# Patient Record
Sex: FEMALE | Race: WHITE | NOT HISPANIC OR LATINO | Employment: PART TIME | URBAN - METROPOLITAN AREA
[De-identification: names, ages, dates, MRNs, and addresses within clinical notes are randomized per-mention and may not be internally consistent; named-entity substitution may affect disease eponyms.]

---

## 2017-01-11 RX ORDER — ATORVASTATIN CALCIUM 40 MG/1
TABLET, FILM COATED ORAL
Qty: 90 TABLET | Refills: 1 | Status: SHIPPED | OUTPATIENT
Start: 2017-01-11 | End: 2017-03-18 | Stop reason: HOSPADM

## 2017-02-13 RX ORDER — DILTIAZEM HYDROCHLORIDE 60 MG/1
TABLET, FILM COATED ORAL
Qty: 30.6 INHALER | Refills: 1 | Status: SHIPPED | OUTPATIENT
Start: 2017-02-13

## 2017-03-16 ENCOUNTER — APPOINTMENT (OUTPATIENT)
Dept: GENERAL RADIOLOGY | Facility: HOSPITAL | Age: 72
End: 2017-03-16

## 2017-03-16 ENCOUNTER — APPOINTMENT (OUTPATIENT)
Dept: GENERAL RADIOLOGY | Facility: HOSPITAL | Age: 72
End: 2017-03-16
Attending: HOSPITALIST

## 2017-03-16 ENCOUNTER — HOSPITAL ENCOUNTER (OUTPATIENT)
Facility: HOSPITAL | Age: 72
Setting detail: OBSERVATION
Discharge: HOME | End: 2017-03-18
Attending: EMERGENCY MEDICINE | Admitting: HOSPITALIST

## 2017-03-16 ENCOUNTER — APPOINTMENT (OUTPATIENT)
Dept: CT IMAGING | Facility: HOSPITAL | Age: 72
End: 2017-03-16

## 2017-03-16 DIAGNOSIS — R55 NEAR SYNCOPE: ICD-10-CM

## 2017-03-16 DIAGNOSIS — K92.2 GASTROINTESTINAL HEMORRHAGE, UNSPECIFIED GASTROINTESTINAL HEMORRHAGE TYPE: ICD-10-CM

## 2017-03-16 DIAGNOSIS — K52.9 COLITIS: Primary | ICD-10-CM

## 2017-03-16 PROBLEM — I95.1 ORTHOSTATIC HYPOTENSION: Status: ACTIVE | Noted: 2017-03-16

## 2017-03-16 PROBLEM — D62 ANEMIA DUE TO ACUTE BLOOD LOSS: Status: ACTIVE | Noted: 2017-03-16

## 2017-03-16 PROBLEM — K55.9 ISCHEMIC COLITIS (HCC): Status: ACTIVE | Noted: 2017-03-16

## 2017-03-16 LAB
ABO GROUP BLD: NORMAL
ALBUMIN SERPL-MCNC: 4.2 G/DL (ref 3.5–5.2)
ALBUMIN/GLOB SERPL: 1.5 G/DL
ALP SERPL-CCNC: 76 U/L (ref 39–117)
ALT SERPL W P-5'-P-CCNC: 19 U/L (ref 1–33)
ANION GAP SERPL CALCULATED.3IONS-SCNC: 12.9 MMOL/L
AST SERPL-CCNC: 20 U/L (ref 1–32)
BACTERIA UR QL AUTO: NORMAL /HPF
BASOPHILS # BLD AUTO: 0.02 10*3/MM3 (ref 0–0.2)
BASOPHILS NFR BLD AUTO: 0.3 % (ref 0–1.5)
BILIRUB SERPL-MCNC: 0.5 MG/DL (ref 0.1–1.2)
BILIRUB UR QL STRIP: NEGATIVE
BLD GP AB SCN SERPL QL: NEGATIVE
BUN BLD-MCNC: 24 MG/DL (ref 8–23)
BUN/CREAT SERPL: 24.5 (ref 7–25)
CALCIUM SPEC-SCNC: 9.3 MG/DL (ref 8.6–10.5)
CHLORIDE SERPL-SCNC: 102 MMOL/L (ref 98–107)
CLARITY UR: CLEAR
CO2 SERPL-SCNC: 26.1 MMOL/L (ref 22–29)
COLOR UR: YELLOW
CREAT BLD-MCNC: 0.98 MG/DL (ref 0.57–1)
DEPRECATED RDW RBC AUTO: 43.2 FL (ref 37–54)
EOSINOPHIL # BLD AUTO: 0.03 10*3/MM3 (ref 0–0.7)
EOSINOPHIL NFR BLD AUTO: 0.5 % (ref 0.3–6.2)
ERYTHROCYTE [DISTWIDTH] IN BLOOD BY AUTOMATED COUNT: 12.7 % (ref 11.7–13)
GFR SERPL CREATININE-BSD FRML MDRD: 56 ML/MIN/1.73
GLOBULIN UR ELPH-MCNC: 2.8 GM/DL
GLUCOSE BLD-MCNC: 118 MG/DL (ref 65–99)
GLUCOSE UR STRIP-MCNC: NEGATIVE MG/DL
HCT VFR BLD AUTO: 30.5 % (ref 35.6–45.5)
HCT VFR BLD AUTO: 32.3 % (ref 35.6–45.5)
HGB BLD-MCNC: 10.1 G/DL (ref 11.9–15.5)
HGB BLD-MCNC: 10.9 G/DL (ref 11.9–15.5)
HGB UR QL STRIP.AUTO: NEGATIVE
HYALINE CASTS UR QL AUTO: NORMAL /LPF
IMM GRANULOCYTES # BLD: 0.02 10*3/MM3 (ref 0–0.03)
IMM GRANULOCYTES NFR BLD: 0.3 % (ref 0–0.5)
KETONES UR QL STRIP: NEGATIVE
LEUKOCYTE ESTERASE UR QL STRIP.AUTO: ABNORMAL
LYMPHOCYTES # BLD AUTO: 1.37 10*3/MM3 (ref 0.9–4.8)
LYMPHOCYTES NFR BLD AUTO: 21.4 % (ref 19.6–45.3)
MCH RBC QN AUTO: 31.5 PG (ref 26.9–32)
MCHC RBC AUTO-ENTMCNC: 33.7 G/DL (ref 32.4–36.3)
MCV RBC AUTO: 93.4 FL (ref 80.5–98.2)
MONOCYTES # BLD AUTO: 0.63 10*3/MM3 (ref 0.2–1.2)
MONOCYTES NFR BLD AUTO: 9.8 % (ref 5–12)
NEUTROPHILS # BLD AUTO: 4.34 10*3/MM3 (ref 1.9–8.1)
NEUTROPHILS NFR BLD AUTO: 67.7 % (ref 42.7–76)
NITRITE UR QL STRIP: NEGATIVE
NT-PROBNP SERPL-MCNC: 176.9 PG/ML (ref 0–900)
PH UR STRIP.AUTO: 7 [PH] (ref 5–8)
PLATELET # BLD AUTO: 213 10*3/MM3 (ref 140–500)
PMV BLD AUTO: 9.8 FL (ref 6–12)
POTASSIUM BLD-SCNC: 4.1 MMOL/L (ref 3.5–5.2)
PROT SERPL-MCNC: 7 G/DL (ref 6–8.5)
PROT UR QL STRIP: NEGATIVE
RBC # BLD AUTO: 3.46 10*6/MM3 (ref 3.9–5.2)
RBC # UR: NORMAL /HPF
REF LAB TEST METHOD: NORMAL
RH BLD: POSITIVE
SODIUM BLD-SCNC: 141 MMOL/L (ref 136–145)
SP GR UR STRIP: 1.02 (ref 1–1.03)
SQUAMOUS #/AREA URNS HPF: NORMAL /HPF
UROBILINOGEN UR QL STRIP: ABNORMAL
WBC NRBC COR # BLD: 6.41 10*3/MM3 (ref 4.5–10.7)
WBC UR QL AUTO: NORMAL /HPF

## 2017-03-16 PROCEDURE — 99284 EMERGENCY DEPT VISIT MOD MDM: CPT

## 2017-03-16 PROCEDURE — 74177 CT ABD & PELVIS W/CONTRAST: CPT

## 2017-03-16 PROCEDURE — G0378 HOSPITAL OBSERVATION PER HR: HCPCS

## 2017-03-16 PROCEDURE — 86900 BLOOD TYPING SEROLOGIC ABO: CPT | Performed by: NURSE PRACTITIONER

## 2017-03-16 PROCEDURE — 0 IOPAMIDOL 61 % SOLUTION: Performed by: NURSE PRACTITIONER

## 2017-03-16 PROCEDURE — 96361 HYDRATE IV INFUSION ADD-ON: CPT

## 2017-03-16 PROCEDURE — 86901 BLOOD TYPING SEROLOGIC RH(D): CPT | Performed by: NURSE PRACTITIONER

## 2017-03-16 PROCEDURE — 81001 URINALYSIS AUTO W/SCOPE: CPT | Performed by: NURSE PRACTITIONER

## 2017-03-16 PROCEDURE — 85014 HEMATOCRIT: CPT | Performed by: HOSPITALIST

## 2017-03-16 PROCEDURE — 83880 ASSAY OF NATRIURETIC PEPTIDE: CPT | Performed by: NURSE PRACTITIONER

## 2017-03-16 PROCEDURE — 85025 COMPLETE CBC W/AUTO DIFF WBC: CPT | Performed by: NURSE PRACTITIONER

## 2017-03-16 PROCEDURE — 86850 RBC ANTIBODY SCREEN: CPT | Performed by: NURSE PRACTITIONER

## 2017-03-16 PROCEDURE — 85018 HEMOGLOBIN: CPT | Performed by: HOSPITALIST

## 2017-03-16 PROCEDURE — 71101 X-RAY EXAM UNILAT RIBS/CHEST: CPT

## 2017-03-16 PROCEDURE — 71020 HC CHEST PA AND LATERAL: CPT

## 2017-03-16 PROCEDURE — 80053 COMPREHEN METABOLIC PANEL: CPT | Performed by: NURSE PRACTITIONER

## 2017-03-16 PROCEDURE — 96360 HYDRATION IV INFUSION INIT: CPT

## 2017-03-16 RX ORDER — LISINOPRIL 10 MG/1
10 TABLET ORAL DAILY
Status: DISCONTINUED | OUTPATIENT
Start: 2017-03-17 | End: 2017-03-18 | Stop reason: HOSPADM

## 2017-03-16 RX ORDER — HYDROCODONE BITARTRATE AND ACETAMINOPHEN 7.5; 325 MG/1; MG/1
1 TABLET ORAL EVERY 6 HOURS PRN
Status: DISCONTINUED | OUTPATIENT
Start: 2017-03-16 | End: 2017-03-18 | Stop reason: HOSPADM

## 2017-03-16 RX ORDER — BUDESONIDE AND FORMOTEROL FUMARATE DIHYDRATE 80; 4.5 UG/1; UG/1
2 AEROSOL RESPIRATORY (INHALATION)
Status: DISCONTINUED | OUTPATIENT
Start: 2017-03-16 | End: 2017-03-17

## 2017-03-16 RX ORDER — ACETAMINOPHEN 325 MG/1
650 TABLET ORAL EVERY 4 HOURS PRN
Status: DISCONTINUED | OUTPATIENT
Start: 2017-03-16 | End: 2017-03-18 | Stop reason: HOSPADM

## 2017-03-16 RX ORDER — HYDROCODONE BITARTRATE AND ACETAMINOPHEN 7.5; 325 MG/1; MG/1
1 TABLET ORAL ONCE
Status: COMPLETED | OUTPATIENT
Start: 2017-03-16 | End: 2017-03-16

## 2017-03-16 RX ORDER — ONDANSETRON 2 MG/ML
4 INJECTION INTRAMUSCULAR; INTRAVENOUS EVERY 4 HOURS PRN
Status: DISCONTINUED | OUTPATIENT
Start: 2017-03-16 | End: 2017-03-18 | Stop reason: HOSPADM

## 2017-03-16 RX ORDER — SODIUM CHLORIDE 0.9 % (FLUSH) 0.9 %
1-10 SYRINGE (ML) INJECTION AS NEEDED
Status: DISCONTINUED | OUTPATIENT
Start: 2017-03-16 | End: 2017-03-18 | Stop reason: HOSPADM

## 2017-03-16 RX ORDER — DIPHENHYDRAMINE HCL 50 MG
50 CAPSULE ORAL NIGHTLY PRN
COMMUNITY

## 2017-03-16 RX ORDER — ATORVASTATIN CALCIUM 40 MG/1
40 TABLET, FILM COATED ORAL DAILY
Status: DISCONTINUED | OUTPATIENT
Start: 2017-03-17 | End: 2017-03-18 | Stop reason: HOSPADM

## 2017-03-16 RX ORDER — FLUOXETINE HYDROCHLORIDE 20 MG/1
40 CAPSULE ORAL DAILY
Status: DISCONTINUED | OUTPATIENT
Start: 2017-03-17 | End: 2017-03-18 | Stop reason: HOSPADM

## 2017-03-16 RX ORDER — SODIUM CHLORIDE 9 MG/ML
100 INJECTION, SOLUTION INTRAVENOUS CONTINUOUS
Status: DISCONTINUED | OUTPATIENT
Start: 2017-03-16 | End: 2017-03-17

## 2017-03-16 RX ORDER — DIPHENHYDRAMINE HCL 25 MG
50 CAPSULE ORAL NIGHTLY PRN
Status: DISCONTINUED | OUTPATIENT
Start: 2017-03-16 | End: 2017-03-18 | Stop reason: HOSPADM

## 2017-03-16 RX ORDER — ALBUTEROL SULFATE 2.5 MG/3ML
2.5 SOLUTION RESPIRATORY (INHALATION) EVERY 6 HOURS PRN
Status: DISCONTINUED | OUTPATIENT
Start: 2017-03-16 | End: 2017-03-18 | Stop reason: HOSPADM

## 2017-03-16 RX ORDER — NITROGLYCERIN 0.4 MG/1
0.4 TABLET SUBLINGUAL
Status: DISCONTINUED | OUTPATIENT
Start: 2017-03-16 | End: 2017-03-18 | Stop reason: HOSPADM

## 2017-03-16 RX ADMIN — IOPAMIDOL 85 ML: 612 INJECTION, SOLUTION INTRAVENOUS at 12:38

## 2017-03-16 RX ADMIN — SODIUM CHLORIDE 100 ML/HR: 9 INJECTION, SOLUTION INTRAVENOUS at 18:15

## 2017-03-16 RX ADMIN — HYDROCODONE BITARTRATE AND ACETAMINOPHEN 1 TABLET: 7.5; 325 TABLET ORAL at 11:44

## 2017-03-16 NOTE — ED PROVIDER NOTES
Pt presents to the ED c/o bright red blood in her stool onset yesterday. She reports having a routine colonoscopy 6 days ago and had a polyp removed. She also reports syncopal episode yesterday and sharp back pain, noting that she feel and hit her back several days ago. Upon exam, pt is alert and oriented x3. Her PERRL and she has moist mucous membranes. Her heart is regular rate and rhythm in the 90's. Pt's lungs are clear to auscultation bilaterally. Her bowel sounds are diminished and she has RLQ abd tenderness. There is point tenderness to the R subscapular area. Will admit to [facility] for further care.    I supervised care provided by the midlevel provider.    We have discussed this patient's history, physical exam, and treatment plan.   I have reviewed the note and personally saw and examined the patient and agree with the plan of care.    Documentation assistance provided by john Kumari for Dr. Mccracken.  Information recorded by the scribe was done at my direction and has been verified and validated by me.     Travis Kumari  03/16/17 5999       Khai Mccracken MD  03/16/17 2024

## 2017-03-16 NOTE — SIGNIFICANT NOTE
03/16/17 1529   Rehab Treatment   Discipline physical therapist   Rehab Evaluation   Evaluation Not Performed patient unavailable for evaluation  (Pt off floor for x-ray PT will follow up3/17)   Recommendation   PT - Next Appointment 03/17/17

## 2017-03-16 NOTE — PLAN OF CARE
Problem: Pain, Acute (Adult)  Goal: Identify Related Risk Factors and Signs and Symptoms  Outcome: Outcome(s) achieved Date Met:  03/16/17 03/16/17 3298   Pain, Acute   Related Risk Factors (Acute Pain) trauma   Signs and Symptoms (Acute Pain) verbalization of pain descriptors

## 2017-03-16 NOTE — H&P
HISTORY AND PHYSICAL   [facility]        Patient Identification:  Name: Crys Dumont  Age: 72 y.o.  Sex: female  :  1945  MRN: [id number]                     Primary Care Physician: Edilson Ascencio MD    Chief Complaint:  Near syncope and bright red blood per rectum     History of Present Illness:   Ms Dumont is a pleasant 72-year-old female who states that she had a colonoscopy performed by Dr. Hardin at [facility] on Friday prior to this admission.  I do not have the actual documentation from the colonoscopy at this time but the patient and son at bedside states that there was one polyp less than a centimeter which was removed and there was a poor bowel prep but there was no mention of any issues with diverticulosis.  The patient states she did fine over the weekend on Saturday and .  She states  night she rolled out of bed which was a mechanical fall and there was no issue of syncope at that time.  She states she went to work as the beginning of the week and she had a near syncopal episode where she just felt everything kind of fading and she sat down and never really truly lost consciousness.  She denied any chest pain at the time but did admit that her blood pressure medication was recently doubled.  She's not had any issues with fever chills or night sweats or nor she had any major complaints of abdominal pain.  She came to the hospital because she had some bright red blood per rectum was found to be heme positive.  The CT report is pending at this time her regards to the abdomen and pelvis that was conveyed to me that there aspects of descending colitis.  Patient was also found to have lower blood count at 10.9.  Patient admits a feeling fatigued but denies any issues with nausea vomiting chest pain or shortness of breath.    Past Medical History:  Past Medical History   Diagnosis Date   • Breast cancer    • COPD (chronic obstructive pulmonary disease)    •  Hyperlipidemia    • Hypertension      Past Surgical History:  Past Surgical History   Procedure Laterality Date   • Cataract extraction Right 12/08/2015   • Cataract extraction Left 12/29/2015   • Breast lumpectomy with axillary node dissection Left 1998   • Mastectomy Left 2008   • Orif ankle fracture Left       Home Meds:  Prescriptions Prior to Admission   Medication Sig Dispense Refill Last Dose   • atorvastatin (LIPITOR) 40 MG tablet Take 40 mg by mouth Daily.   Taking   • calcium carbonate-vitamin d 600-400 MG-UNIT per tablet Take 1 tablet by mouth Daily.   Taking   • diphenhydrAMINE (BENADRYL) 50 MG capsule Take 50 mg by mouth At Night As Needed for Itching.      • FLUoxetine (PROzac) 20 MG capsule Take 1 capsule by mouth daily. (Patient taking differently: Take 40 mg by mouth Daily.) 90 capsule 1    • lisinopril (PRINIVIL,ZESTRIL) 10 MG tablet Take 20 mg by mouth Daily.   Taking   • Multiple Vitamin (MULTIVITAMIN) capsule Take 1 capsule by mouth Daily.   Taking   • PROAIR  (90 BASE) MCG/ACT inhaler INHALE 2 PUFFS BY MOUTH EVERY 4 TO 6 HOURS AS NEEDED 8.5 inhaler 1 Taking   • SYMBICORT 80-4.5 MCG/ACT inhaler INHALE 2 PUFFS TWICE DAILY. RINSE MOUTH AFTER USE. 30.6 inhaler 1    • atorvastatin (LIPITOR) 40 MG tablet TAKE 1 TABLET BY MOUTH EVERY DAY  1 Taking   • atorvastatin (LIPITOR) 40 MG tablet TAKE 1 TABLET BY MOUTH EVERY DAY 90 tablet 1    • FLUoxetine (PROzac) 20 MG capsule TAKE ONE CAPSULE BY MOUTH EVERY DAY 30 capsule 5 Taking   • Ibuprofen 200 MG capsule Take 2 tablets by mouth Every 8 (Eight) Hours As Needed (pain).   Taking   • lisinopril (PRINIVIL,ZESTRIL) 10 MG tablet TAKE 1 TABLET BY MOUTH EVERY DAY  1 Taking   • lisinopril (PRINIVIL,ZESTRIL) 10 MG tablet TAKE 1 TABLET BY MOUTH EVERY DAY 90 tablet 1    • Omega 3 1000 MG capsule Take  by mouth.   Taking       Allergies:  Allergies   Allergen Reactions   • Sulfa Antibiotics      Immunizations:  Immunization History   Administered Date(s)  "Administered   • Influenza TIV (IM) 10/19/2013     Social History:   Social History     Social History Narrative   • No narrative on file     Social History     Social History   • Marital status:      Spouse name: N/A   • Number of children: N/A   • Years of education: N/A     Occupational History   • Not on file.     Social History Main Topics   • Smoking status: Former Smoker     Packs/day: 0.50     Years: 10.00     Types: Cigarettes   • Smokeless tobacco: Never Used   • Alcohol use 1.2 oz/week     1 Glasses of wine, 1 Cans of beer per week   • Drug use: No   • Sexual activity: Defer     Other Topics Concern   • Not on file     Social History Narrative   • No narrative on file       Family History:  History reviewed. No pertinent family history.     Review of Systems  See history of present illness and past medical history.  Patient denies any acute changes to vision smell taste or sound.  Denies any headache.  Admits to near syncope.  Denies any dysphasia or neck pain.  Denies any palpitations cough or shortness of breath.  She denies any abdominal pain.  She missed to some right-sided chest wall pain.  Denies any dysuria but admits to bright red blood per rectum.  Denies any bruising bleeding or loss of function.  Remainder of ROS is negative.    Objective:  tMax 24 hrs: Temp (24hrs), Av.8 °F (37.1 °C), Min:98.1 °F (36.7 °C), Max:99.3 °F (37.4 °C)    Vitals Ranges:   Temp:  [98.1 °F (36.7 °C)-99.3 °F (37.4 °C)] 98.8 °F (37.1 °C)  Heart Rate:  [] 95  Resp:  [16-18] 16  BP: (123-152)/(76-92) 152/82      Exam:  Visit Vitals   • /82 (BP Location: Right arm, Patient Position: Lying)   • Pulse 95   • Temp 98.8 °F (37.1 °C) (Oral)   • Resp 16   • Ht 62\" (157.5 cm)   • Wt 136 lb (61.7 kg)   • SpO2 96%   • BMI 24.87 kg/m2       General Appearance:    Alert, cooperative, no distress, AOx3, fluent speech, not toxic   Head:    Normocephalic, without obvious abnormality, atraumatic   Eyes:    JUANITA, " "conjunctiva/corneas clear, EOM's intact, both eyes   Ears:    Normal external ear canals, both ears   Nose:   Nares normal, septum midline, mucosa normal, no drainage    or sinus tenderness   Throat:   Lips, mucosa, and tongue normal   Neck:   Supple, no point ttp or JVD   Back:     Symmetric, no point ttp   Lungs:     Clear to auscultation bilaterally, respirations unlabored   Chest Wall:    Right posterior axillary line bruise ~2-3\" diameter    Heart:    Regular rate and rhythm, S1 and S2 normal, no murmur   Abdomen:     Soft, RLQ ttp w/ deep palpation w/out rebound or guarding, bowel sounds active all four quadrants, no masses, no hepatomegaly, no splenomegaly   Extremities:   Extremities normal, atraumatic, no cyanosis or edema   Pulses:   2+ and symmetric all extremities           Neurologic:   CNII-XII intact, moving all extremities w/out focal deficit      .    Data Review:  Labs in chart were reviewed.             Imaging Results (all)     Procedure Component Value Units Date/Time    XR Chest 2 View [[id number]] Collected:  03/16/17 1111     Updated:  03/16/17 1117    Narrative:       XR CHEST 2 VW-     Clinical: Cough     COMPARISON: None     FINDINGS: Previous left mastectomy. Calcified benign granuloma right and  left upper lobes. The heart size is normal. Mediastinum is satisfactory  in appearance. Small calcified benign bilateral hilar lymph nodes.     Right ninth rib subacute or old fracture.     No pulmonary edema, pleural effusion or acute consolidation is  demonstrated. The remainder is unremarkable.     This report was finalized on 3/16/2017 11:14 AM by Dr. Dallas Valenzuela MD.       CT Abdomen Pelvis With Contrast [[id number]]      Updated:  03/16/17 1240            Assessment:  Principal Problem:    Colitis  Active Problems:    Syncope    Orthostatic hypotension    Anemia due to acute blood loss      Plan:  Colitis with recent Cscope w/ reported polypectomy x1 w/ poor bowel prep though patient and " family are unsure if there was any diverticulosis   -clears   -consult GI for further recs    Near Syncope secondary to orthostasis - BP meds were recently doubled - IVF/monitor - PT eval   -check echo     ABLA/heme + in ER - monitor q8 hours the first 24hours    SCDs for DVT prophylaxis     Right sided chest wall pain - bruised over area most tender - check rib xray to r/out fracture    Further recommendations to follow as clinical course unfolds.      Grant Arana MD  3/16/2017  8:46 PM

## 2017-03-16 NOTE — ED PROVIDER NOTES
EMERGENCY DEPARTMENT ENCOUNTER    CHIEF COMPLAINT  Chief Complaint: blood in stool  History given by:patient, family  History limited by:nothing  Room Number: 502/1  PMD: Edilson Ascencio MD      HPI:  Pt is a 72 y.o. female who presents with bright red blood in her stool onset 1 day ago. Pt had a routine colonoscopy 6 days ago and had a polyp removed. Pt states she then fell from her bed 4 days ago and hit the R side of her back and her head on her nightstand and c/o back pain at that time. She saw [facility] 3 days ago and had a negative XR, and states the back pain resolved. Pt had near syncope yesterday and sat down on the floor and states she could hear people talking but her vision was black. She denies chest pain, SOA at that time, and states she had eaten breakfast that morning. She is primarily standing at her job. She went to the [facility] and was d/c. She noticed when she had a BM there that there was blood in the stool. Pt states when she had another BM last night there was bright red blood visible on the toilet paper. Pt also states the back pain returned last night and is worsened w/ coughing. Pt has not had another BM since last night, and has not eaten today. She c/o cough that her family states is chronic but denies abd pain, rectal pain, dysuria.    GI - at [facility] [place]    Duration: 1 day  Timing:episodic  Location:stool  Radiation:none  Quality:bright red  Intensity/Severity:moderate  Progression:unchanged  Associated Symptoms:back pain, cough  Aggravating Factors:BMs  Alleviating Factors:none  Previous Episodes:none  Treatment before arrival:none    PAST MEDICAL HISTORY  Active Ambulatory Problems     Diagnosis Date Noted   • Asthma 03/17/2016   • Paraparesis 03/17/2016   • Shoulder pain 03/17/2016   • Malignant neoplasm of breast 03/17/2016   • Cataract of both eyes 03/17/2016   • Abnormal kidney function 03/17/2016   • Fatigue 03/17/2016   • Unsteady gait 03/17/2016   • Hypertension  03/17/2016   • Hyperlipidemia 03/17/2016   • Abnormal glucose tolerance test (GTT) 03/17/2016   • Arthralgia of hip 03/17/2016   • Impairment of balance 03/17/2016   • Thoracic back pain 03/17/2016   • Positive Romberg test 03/17/2016     Resolved Ambulatory Problems     Diagnosis Date Noted   • Elevated blood-pressure reading without diagnosis of hypertension 03/17/2016     Past Medical History   Diagnosis Date   • Breast cancer    • COPD (chronic obstructive pulmonary disease)        PAST SURGICAL HISTORY  Past Surgical History   Procedure Laterality Date   • Cataract extraction Right 12/08/2015   • Cataract extraction Left 12/29/2015   • Breast lumpectomy with axillary node dissection Left 1998   • Mastectomy Left 2008   • Orif ankle fracture Left        FAMILY HISTORY  History reviewed. No pertinent family history.    SOCIAL HISTORY  Social History     Social History   • Marital status:      Spouse name: N/A   • Number of children: N/A   • Years of education: N/A     Occupational History   • Not on file.     Social History Main Topics   • Smoking status: Former Smoker   • Smokeless tobacco: Not on file   • Alcohol use Yes   • Drug use: Not on file   • Sexual activity: Not on file     Other Topics Concern   • Not on file     Social History Narrative   • No narrative on file       ALLERGIES  Sulfa antibiotics    REVIEW OF SYSTEMS  Review of Systems   Constitutional: Negative.  Negative for activity change, appetite change ( decreased), chills, fatigue and fever.   HENT: Negative.  Negative for congestion, ear pain, rhinorrhea and sore throat.    Eyes: Negative.    Respiratory: Positive for cough. Negative for shortness of breath.    Cardiovascular: Negative.  Negative for chest pain, palpitations and leg swelling ( pedal).   Gastrointestinal: Positive for blood in stool (bright red). Negative for abdominal pain, constipation, diarrhea, nausea and vomiting.   Endocrine: Negative.    Genitourinary: Negative.   Negative for decreased urine volume, difficulty urinating, dysuria, menstrual problem, pelvic pain, urgency and vaginal discharge.   Musculoskeletal: Positive for back pain.   Skin: Negative.  Negative for rash.   Allergic/Immunologic: Negative.    Neurological: Negative.  Negative for dizziness, weakness, light-headedness, numbness and headaches.   Hematological: Negative.    Psychiatric/Behavioral: Negative.  The patient is not nervous/anxious.    All other systems reviewed and are negative.      PHYSICAL EXAM  ED Triage Vitals   Temp Heart Rate Resp BP SpO2   -- 03/16/17 1123 03/16/17 1129 03/16/17 1123 03/16/17 1131    86 18 149/76 98 %      Temp src Heart Rate Source Patient Position BP Location FiO2 (%)   -- -- 03/16/17 1123 -- --     Lying         Physical Exam   Constitutional: She is oriented to person, place, and time and well-developed, well-nourished, and in no distress. No distress.   HENT:   Head: Normocephalic and atraumatic.   Mouth/Throat: Oropharynx is clear and moist and mucous membranes are normal.   Eyes: Pupils are equal, round, and reactive to light.   Neck: Normal range of motion.   Cardiovascular: Normal rate, regular rhythm and normal heart sounds.    Pulmonary/Chest: Effort normal and breath sounds normal. She has no wheezes.   Abdominal: Soft. Bowel sounds are normal. There is no tenderness.   Genitourinary: Rectal exam shows guaiac positive stool.   Genitourinary Comments: Chaperone present for rectal exam.   Musculoskeletal: Normal range of motion. She exhibits no edema.   Neurological: She is alert and oriented to person, place, and time.   Skin: Skin is warm and dry. No rash noted.   Psychiatric: Mood, memory, affect and judgment normal.   Nursing note and vitals reviewed.      LAB RESULTS  Recent Results (from the past 24 hour(s))   Comprehensive Metabolic Panel    Collection Time: 03/16/17 11:43 AM   Result Value Ref Range    Glucose 118 (H) 65 - 99 mg/dL    BUN 24 (H) 8 - 23 mg/dL     Creatinine 0.98 0.57 - 1.00 mg/dL    Sodium 141 136 - 145 mmol/L    Potassium 4.1 3.5 - 5.2 mmol/L    Chloride 102 98 - 107 mmol/L    CO2 26.1 22.0 - 29.0 mmol/L    Calcium 9.3 8.6 - 10.5 mg/dL    Total Protein 7.0 6.0 - 8.5 g/dL    Albumin 4.20 3.50 - 5.20 g/dL    ALT (SGPT) 19 1 - 33 U/L    AST (SGOT) 20 1 - 32 U/L    Alkaline Phosphatase 76 39 - 117 U/L    Total Bilirubin 0.5 0.1 - 1.2 mg/dL    eGFR Non African Amer 56 (L) >60 mL/min/1.73    Globulin 2.8 gm/dL    A/G Ratio 1.5 g/dL    BUN/Creatinine Ratio 24.5 7.0 - 25.0    Anion Gap 12.9 mmol/L   BNP    Collection Time: 03/16/17 11:43 AM   Result Value Ref Range    proBNP 176.9 0.0 - 900.0 pg/mL   CBC Auto Differential    Collection Time: 03/16/17 11:43 AM   Result Value Ref Range    WBC 6.41 4.50 - 10.70 10*3/mm3    RBC 3.46 (L) 3.90 - 5.20 10*6/mm3    Hemoglobin 10.9 (L) 11.9 - 15.5 g/dL    Hematocrit 32.3 (L) 35.6 - 45.5 %    MCV 93.4 80.5 - 98.2 fL    MCH 31.5 26.9 - 32.0 pg    MCHC 33.7 32.4 - 36.3 g/dL    RDW 12.7 11.7 - 13.0 %    RDW-SD 43.2 37.0 - 54.0 fl    MPV 9.8 6.0 - 12.0 fL    Platelets 213 140 - 500 10*3/mm3    Neutrophil % 67.7 42.7 - 76.0 %    Lymphocyte % 21.4 19.6 - 45.3 %    Monocyte % 9.8 5.0 - 12.0 %    Eosinophil % 0.5 0.3 - 6.2 %    Basophil % 0.3 0.0 - 1.5 %    Immature Grans % 0.3 0.0 - 0.5 %    Neutrophils, Absolute 4.34 1.90 - 8.10 10*3/mm3    Lymphocytes, Absolute 1.37 0.90 - 4.80 10*3/mm3    Monocytes, Absolute 0.63 0.20 - 1.20 10*3/mm3    Eosinophils, Absolute 0.03 0.00 - 0.70 10*3/mm3    Basophils, Absolute 0.02 0.00 - 0.20 10*3/mm3    Immature Grans, Absolute 0.02 0.00 - 0.03 10*3/mm3   Urinalysis With / Culture If Indicated    Collection Time: 03/16/17 12:00 PM   Result Value Ref Range    Color, UA Yellow Yellow, Straw    Appearance, UA Clear Clear    pH, UA 7.0 5.0 - 8.0    Specific Gravity, UA 1.017 1.005 - 1.030    Glucose, UA Negative Negative    Ketones, UA Negative Negative    Bilirubin, UA Negative Negative    Blood,  UA Negative Negative    Protein, UA Negative Negative    Leuk Esterase, UA Trace (A) Negative    Nitrite, UA Negative Negative    Urobilinogen, UA 1.0 E.U./dL 0.2 - 1.0 E.U./dL   Urinalysis, Microscopic Only    Collection Time: 03/16/17 12:00 PM   Result Value Ref Range    RBC, UA 0-2 None Seen, 0-2 /HPF    WBC, UA 0-2 None Seen, 0-2 /HPF    Bacteria, UA None Seen None Seen /HPF    Squamous Epithelial Cells, UA 0-2 None Seen, 0-2 /HPF    Hyaline Casts, UA 0-2 None Seen /LPF    Methodology Automated Microscopy    Type & Screen    Collection Time: 03/16/17 12:10 PM   Result Value Ref Range    ABO Type O     RH type Positive     Antibody Screen Negative      I ordered the above labs and reviewed the results      RADIOLOGY  CT Abdomen Pelvis With Contrast   Preliminary Result   1. Mild wall thickening involving the descending and sigmoid colon. This   is in part related to incomplete distention though there is suspected   mild colitis. No other evidence for acute process.   2. Atherosclerotic disease.   3. Osteoarthritis left hip.          XR Chest 2 View   Final Result      XR Ribs 2 View Right    (Results Pending)   CT abd/pel shows mild colitis w/ mildly thickened descending colon wall. No perforation.    I ordered the above noted radiological studies and reviewed the images on the PACS system. Discussed with Dr. Laurent, radiology.      MEDICAL RECORD REVIEW    Hgb was 11.1 at [facility], platelets 206 per d/c paperwork.       PROGRESS AND CONSULTS    1110  Rechecked pt to perform rectal exam.    1133  Per nursing pt's orthostatic vitals are lying 149/76 HR 86, standing 147/81 . Pt c/o dizziness and lightheadedness when standing.    1253  Reviewed pt's history and workup with Dr. Mccracken.  After a bedside evaluation; Dr Mccracken agrees with the plan of care    1256  Placed call to [facility] to admit pt.    1319  Call returned from Dr. Arana ([facility]) who agrees to admit pt and recommends no abx at this time.    COURSE &  "MEDICAL DECISION MAKING  Pertinent Labs and Imaging studies that were ordered and reviewed are noted above.  Results were reviewed/discussed with the patient and they were also made aware of online assess.   Pt also made aware that some labs, such as cultures, will not be resulted during ER visit and follow up with PMD is necessary.       MEDICATIONS GIVEN IN ER  Medications   sodium chloride 0.9 % infusion (not administered)   nitroglycerin (NITROSTAT) SL tablet 0.4 mg (not administered)   sodium chloride 0.9 % flush 1-10 mL (not administered)   acetaminophen (TYLENOL) tablet 650 mg (not administered)   ondansetron (ZOFRAN) injection 4 mg (not administered)   HYDROcodone-acetaminophen (NORCO) 7.5-325 MG per tablet 1 tablet (not administered)   HYDROcodone-acetaminophen (NORCO) 7.5-325 MG per tablet 1 tablet (1 tablet Oral Given 3/16/17 1144)   iopamidol (ISOVUE-300) 61 % injection 100 mL (85 mL Intravenous Given 3/16/17 1238)       Visit Vitals   • /83 (Patient Position: Lying)   • Pulse 82   • Temp 98.1 °F (36.7 °C) (Tympanic)   • Resp 16   • Ht 62\" (157.5 cm)   • Wt 140 lb (63.5 kg)   • SpO2 96%   • BMI 25.61 kg/m2       ADMISSION    Discussed treatment plan and reason for admission with pt/family and admitting physician.  Pt/family voiced understanding of the plan for admission for further testing/treatment as needed.      DIAGNOSIS  Final diagnoses:   Colitis   Gastrointestinal hemorrhage, unspecified gastrointestinal hemorrhage type   Near syncope       I personally scribed for Natividad Severino on 3/16/2017 at 3:05 PM.  Electronically signed by Antonino Chaudhry on 3/16/2017 at time 3:05 PM     Antonino Chaudhry  03/16/17 1322       Natividad Severino, APRN  03/16/17 1507    "

## 2017-03-16 NOTE — ED NOTES
Pt c/o right sided middle back since last night and bloody stool since yesterday. Pt had a colonoscopy on Friday     Mignon Norman RN  03/16/17 1227

## 2017-03-17 ENCOUNTER — ON CAMPUS - OUTPATIENT (OUTPATIENT)
Dept: URBAN - METROPOLITAN AREA HOSPITAL 114 | Facility: HOSPITAL | Age: 72
End: 2017-03-17

## 2017-03-17 ENCOUNTER — APPOINTMENT (OUTPATIENT)
Dept: CARDIOLOGY | Facility: HOSPITAL | Age: 72
End: 2017-03-17
Attending: HOSPITALIST

## 2017-03-17 DIAGNOSIS — K92.2 GASTROINTESTINAL HEMORRHAGE, UNSPECIFIED: ICD-10-CM

## 2017-03-17 LAB
ANION GAP SERPL CALCULATED.3IONS-SCNC: 14.8 MMOL/L
BH CV ECHO MEAS - ACS: 1.5 CM
BH CV ECHO MEAS - AO MEAN PG (FULL): 4 MMHG
BH CV ECHO MEAS - AO MEAN PG: 6 MMHG
BH CV ECHO MEAS - AO ROOT AREA (BSA CORRECTED): 1.7
BH CV ECHO MEAS - AO ROOT AREA: 6.2 CM^2
BH CV ECHO MEAS - AO ROOT DIAM: 2.8 CM
BH CV ECHO MEAS - AO V2 MAX: 181 CM/SEC
BH CV ECHO MEAS - AO V2 MEAN: 109 CM/SEC
BH CV ECHO MEAS - AO V2 VTI: 33.5 CM
BH CV ECHO MEAS - AVA(I,A): 2.4 CM^2
BH CV ECHO MEAS - AVA(I,D): 2.4 CM^2
BH CV ECHO MEAS - BSA(HAYCOCK): 1.7 M^2
BH CV ECHO MEAS - BSA: 1.6 M^2
BH CV ECHO MEAS - BZI_BMI: 25.1 KILOGRAMS/M^2
BH CV ECHO MEAS - BZI_METRIC_HEIGHT: 157.5 CM
BH CV ECHO MEAS - BZI_METRIC_WEIGHT: 62.1 KG
BH CV ECHO MEAS - CONTRAST EF (2CH): 56.7 ML/M^2
BH CV ECHO MEAS - CONTRAST EF 4CH: 57.9 ML/M^2
BH CV ECHO MEAS - EDV(CUBED): 97.3 ML
BH CV ECHO MEAS - EDV(MOD-SP2): 67 ML
BH CV ECHO MEAS - EDV(MOD-SP4): 57 ML
BH CV ECHO MEAS - EDV(TEICH): 97.3 ML
BH CV ECHO MEAS - EF(CUBED): 72.3 %
BH CV ECHO MEAS - EF(MOD-SP2): 56.7 %
BH CV ECHO MEAS - EF(MOD-SP4): 57.9 %
BH CV ECHO MEAS - EF(TEICH): 64 %
BH CV ECHO MEAS - ESV(CUBED): 27 ML
BH CV ECHO MEAS - ESV(MOD-SP2): 29 ML
BH CV ECHO MEAS - ESV(MOD-SP4): 24 ML
BH CV ECHO MEAS - ESV(TEICH): 35 ML
BH CV ECHO MEAS - FS: 34.8 %
BH CV ECHO MEAS - IVS/LVPW: 1
BH CV ECHO MEAS - IVSD: 0.8 CM
BH CV ECHO MEAS - LAT PEAK E' VEL: 9 CM/SEC
BH CV ECHO MEAS - LV DIASTOLIC VOL/BSA (35-75): 35 ML/M^2
BH CV ECHO MEAS - LV MASS(C)D: 117.9 GRAMS
BH CV ECHO MEAS - LV MASS(C)DI: 72.4 GRAMS/M^2
BH CV ECHO MEAS - LV MEAN PG: 2 MMHG
BH CV ECHO MEAS - LV SYSTOLIC VOL/BSA (12-30): 14.7 ML/M^2
BH CV ECHO MEAS - LV V1 MAX: 116 CM/SEC
BH CV ECHO MEAS - LV V1 MEAN: 70.3 CM/SEC
BH CV ECHO MEAS - LV V1 VTI: 25.6 CM
BH CV ECHO MEAS - LVIDD: 4.6 CM
BH CV ECHO MEAS - LVIDS: 3 CM
BH CV ECHO MEAS - LVLD AP2: 6.9 CM
BH CV ECHO MEAS - LVLD AP4: 7.8 CM
BH CV ECHO MEAS - LVLS AP2: 5.9 CM
BH CV ECHO MEAS - LVLS AP4: 6.4 CM
BH CV ECHO MEAS - LVOT AREA (M): 3.1 CM^2
BH CV ECHO MEAS - LVOT AREA: 3.1 CM^2
BH CV ECHO MEAS - LVOT DIAM: 2 CM
BH CV ECHO MEAS - LVPWD: 0.8 CM
BH CV ECHO MEAS - MED PEAK E' VEL: 9 CM/SEC
BH CV ECHO MEAS - MV A DUR: 0.08 SEC
BH CV ECHO MEAS - MV A MAX VEL: 163 CM/SEC
BH CV ECHO MEAS - MV DEC SLOPE: 673 CM/SEC^2
BH CV ECHO MEAS - MV DEC TIME: 0.19 SEC
BH CV ECHO MEAS - MV E MAX VEL: 118 CM/SEC
BH CV ECHO MEAS - MV E/A: 0.72
BH CV ECHO MEAS - MV MEAN PG: 4 MMHG
BH CV ECHO MEAS - MV P1/2T MAX VEL: 126 CM/SEC
BH CV ECHO MEAS - MV P1/2T: 54.8 MSEC
BH CV ECHO MEAS - MV V2 MEAN: 93.4 CM/SEC
BH CV ECHO MEAS - MV V2 VTI: 39.5 CM
BH CV ECHO MEAS - MVA P1/2T LCG: 1.7 CM^2
BH CV ECHO MEAS - MVA(P1/2T): 4 CM^2
BH CV ECHO MEAS - MVA(VTI): 2 CM^2
BH CV ECHO MEAS - PA ACC SLOPE: 0 CM/SEC^2
BH CV ECHO MEAS - PA ACC TIME: 0.11 SEC
BH CV ECHO MEAS - PA MAX PG (FULL): 2.6 MMHG
BH CV ECHO MEAS - PA MAX PG: 4.2 MMHG
BH CV ECHO MEAS - PA PR(ACCEL): 30 MMHG
BH CV ECHO MEAS - PA V2 MAX: 102 CM/SEC
BH CV ECHO MEAS - PULM A REVS DUR: 0.09 SEC
BH CV ECHO MEAS - PULM A REVS VEL: 31.6 CM/SEC
BH CV ECHO MEAS - PULM DIAS VEL: 58.2 CM/SEC
BH CV ECHO MEAS - PULM S/D: 1.2
BH CV ECHO MEAS - PULM SYS VEL: 69.1 CM/SEC
BH CV ECHO MEAS - PVA(V,A): 1.2 CM^2
BH CV ECHO MEAS - PVA(V,D): 1.2 CM^2
BH CV ECHO MEAS - QP/QS: 0.37
BH CV ECHO MEAS - RAP SYSTOLE: 8 MMHG
BH CV ECHO MEAS - RV MAX PG: 1.6 MMHG
BH CV ECHO MEAS - RV MEAN PG: 1 MMHG
BH CV ECHO MEAS - RV V1 MAX: 62.4 CM/SEC
BH CV ECHO MEAS - RV V1 MEAN: 39.8 CM/SEC
BH CV ECHO MEAS - RV V1 VTI: 14.9 CM
BH CV ECHO MEAS - RVOT AREA: 2 CM^2
BH CV ECHO MEAS - RVOT DIAM: 1.6 CM
BH CV ECHO MEAS - RVSP: 28 MMHG
BH CV ECHO MEAS - SI(AO): 126.7 ML/M^2
BH CV ECHO MEAS - SI(CUBED): 43.2 ML/M^2
BH CV ECHO MEAS - SI(LVOT): 49.4 ML/M^2
BH CV ECHO MEAS - SI(MOD-SP2): 23.3 ML/M^2
BH CV ECHO MEAS - SI(MOD-SP4): 20.3 ML/M^2
BH CV ECHO MEAS - SI(TEICH): 38.3 ML/M^2
BH CV ECHO MEAS - SUP REN AO DIAM: 1.78 CM
BH CV ECHO MEAS - SV(AO): 206.3 ML
BH CV ECHO MEAS - SV(CUBED): 70.3 ML
BH CV ECHO MEAS - SV(LVOT): 80.4 ML
BH CV ECHO MEAS - SV(MOD-SP2): 38 ML
BH CV ECHO MEAS - SV(MOD-SP4): 33 ML
BH CV ECHO MEAS - SV(RVOT): 30 ML
BH CV ECHO MEAS - SV(TEICH): 62.3 ML
BH CV ECHO MEAS - TAPSE (>1.6): 2.2 CM2
BH CV ECHO MEAS - TR MAX VEL: 225 CM/SEC
BH CV XLRA - RV BASE: 2.6 CM
BH CV XLRA - TDI S': 15 CM/SEC
BUN BLD-MCNC: 18 MG/DL (ref 8–23)
BUN/CREAT SERPL: 20.2 (ref 7–25)
CALCIUM SPEC-SCNC: 8.4 MG/DL (ref 8.6–10.5)
CHLORIDE SERPL-SCNC: 105 MMOL/L (ref 98–107)
CO2 SERPL-SCNC: 22.2 MMOL/L (ref 22–29)
CREAT BLD-MCNC: 0.89 MG/DL (ref 0.57–1)
DEPRECATED RDW RBC AUTO: 43.8 FL (ref 37–54)
E/E' RATIO: 13
ERYTHROCYTE [DISTWIDTH] IN BLOOD BY AUTOMATED COUNT: 12.9 % (ref 11.7–13)
GFR SERPL CREATININE-BSD FRML MDRD: 62 ML/MIN/1.73
GLUCOSE BLD-MCNC: 101 MG/DL (ref 65–99)
HCT VFR BLD AUTO: 28 % (ref 35.6–45.5)
HCT VFR BLD AUTO: 29 % (ref 35.6–45.5)
HGB BLD-MCNC: 9.3 G/DL (ref 11.9–15.5)
HGB BLD-MCNC: 9.7 G/DL (ref 11.9–15.5)
LEFT ATRIUM VOLUME INDEX: 15 ML/M2
LV EF 2D ECHO EST: 57 %
MCH RBC QN AUTO: 30.9 PG (ref 26.9–32)
MCHC RBC AUTO-ENTMCNC: 33.4 G/DL (ref 32.4–36.3)
MCV RBC AUTO: 92.4 FL (ref 80.5–98.2)
PLATELET # BLD AUTO: 239 10*3/MM3 (ref 140–500)
PMV BLD AUTO: 9.2 FL (ref 6–12)
POTASSIUM BLD-SCNC: 4 MMOL/L (ref 3.5–5.2)
RBC # BLD AUTO: 3.14 10*6/MM3 (ref 3.9–5.2)
SODIUM BLD-SCNC: 142 MMOL/L (ref 136–145)
WBC NRBC COR # BLD: 7.88 10*3/MM3 (ref 4.5–10.7)

## 2017-03-17 PROCEDURE — 80048 BASIC METABOLIC PNL TOTAL CA: CPT | Performed by: HOSPITALIST

## 2017-03-17 PROCEDURE — G0378 HOSPITAL OBSERVATION PER HR: HCPCS

## 2017-03-17 PROCEDURE — 85027 COMPLETE CBC AUTOMATED: CPT | Performed by: HOSPITALIST

## 2017-03-17 PROCEDURE — 93306 TTE W/DOPPLER COMPLETE: CPT | Performed by: INTERNAL MEDICINE

## 2017-03-17 PROCEDURE — 36415 COLL VENOUS BLD VENIPUNCTURE: CPT | Performed by: HOSPITALIST

## 2017-03-17 PROCEDURE — 94640 AIRWAY INHALATION TREATMENT: CPT

## 2017-03-17 PROCEDURE — G8978 MOBILITY CURRENT STATUS: HCPCS

## 2017-03-17 PROCEDURE — 96361 HYDRATE IV INFUSION ADD-ON: CPT

## 2017-03-17 PROCEDURE — G8979 MOBILITY GOAL STATUS: HCPCS

## 2017-03-17 PROCEDURE — 99222 1ST HOSP IP/OBS MODERATE 55: CPT | Performed by: INTERNAL MEDICINE

## 2017-03-17 PROCEDURE — 97161 PT EVAL LOW COMPLEX 20 MIN: CPT

## 2017-03-17 PROCEDURE — G8980 MOBILITY D/C STATUS: HCPCS

## 2017-03-17 PROCEDURE — 93306 TTE W/DOPPLER COMPLETE: CPT

## 2017-03-17 RX ORDER — BUDESONIDE AND FORMOTEROL FUMARATE DIHYDRATE 80; 4.5 UG/1; UG/1
2 AEROSOL RESPIRATORY (INHALATION)
Status: DISCONTINUED | OUTPATIENT
Start: 2017-03-18 | End: 2017-03-18 | Stop reason: HOSPADM

## 2017-03-17 RX ADMIN — ATORVASTATIN CALCIUM 40 MG: 40 TABLET, FILM COATED ORAL at 08:43

## 2017-03-17 RX ADMIN — LISINOPRIL 10 MG: 10 TABLET ORAL at 08:43

## 2017-03-17 RX ADMIN — HYDROCODONE BITARTRATE AND ACETAMINOPHEN 1 TABLET: 7.5; 325 TABLET ORAL at 14:04

## 2017-03-17 RX ADMIN — FLUOXETINE 40 MG: 20 CAPSULE ORAL at 08:43

## 2017-03-17 RX ADMIN — BUDESONIDE AND FORMOTEROL FUMARATE DIHYDRATE 2 PUFF: 80; 4.5 AEROSOL RESPIRATORY (INHALATION) at 07:39

## 2017-03-17 RX ADMIN — SODIUM CHLORIDE 100 ML/HR: 9 INJECTION, SOLUTION INTRAVENOUS at 02:58

## 2017-03-17 NOTE — THERAPY DISCHARGE NOTE
Acute Care - Physical Therapy Initial Eval/Discharge  [facility]     Patient Name: Crys Dumont  : 1945  MRN: [id number]  Today's Date: 3/17/2017   Onset of Illness/Injury or Date of Surgery Date: 17  Date of Referral to PT: 17  Referring Physician: Yasmeen      Admit Date: 3/16/2017    Visit Dx:    ICD-10-CM ICD-9-CM   1. Colitis K52.9 558.9   2. Gastrointestinal hemorrhage, unspecified gastrointestinal hemorrhage type K92.2 578.9   3. Near syncope R55 780.2     Patient Active Problem List   Diagnosis   • Asthma   • Paraparesis   • Shoulder pain   • Malignant neoplasm of breast   • Cataract of both eyes   • Abnormal kidney function   • Fatigue   • Unsteady gait   • Hypertension   • Hyperlipidemia   • Abnormal glucose tolerance test (GTT)   • Arthralgia of hip   • Impairment of balance   • Thoracic back pain   • Positive Romberg test   • Colitis   • Syncope   • Orthostatic hypotension   • Anemia due to acute blood loss     Past Medical History   Diagnosis Date   • Breast cancer    • COPD (chronic obstructive pulmonary disease)    • Hyperlipidemia    • Hypertension      Past Surgical History   Procedure Laterality Date   • Cataract extraction Right 2015   • Cataract extraction Left 2015   • Breast lumpectomy with axillary node dissection Left    • Mastectomy Left    • Orif ankle fracture Left           PT ASSESSMENT (last 72 hours)      PT Evaluation       17 1313 17 0820    Rehab Evaluation    Document Type evaluation  -MD     Subjective Information agree to therapy;no complaints  -MD     Patient Effort, Rehab Treatment good  -MD     Symptoms Noted During/After Treatment none  -MD     General Information    Patient Profile Review yes  -MD     Onset of Illness/Injury or Date of Surgery Date 17  -MD     Referring Physician Yasmeen  -MD     General Observations Pt supine in bed showing no signs of acute distress.  -MD     Precautions/Limitations no known  precautions/limitations  -MD     Prior Level of Function independent:  -MD     Equipment Currently Used at Home none  -MD     Plans/Goals Discussed With patient  -MD     Living Environment    Lives With alone  -MD alone  -SK    Living Arrangements house  -MD house  -SK    Home Accessibility stairs to enter home  -MD     Number of Stairs to Enter Home 3  -MD     Stair Railings at Home other (see comments)   HR present  -MD     Transportation Available  car;family or friend will provide  -SK    Clinical Impression    Date of Referral to PT 03/17/17  -MD     PT Diagnosis Pt supervision w mobility and in no need of skilled PT at this time.  -MD     Functional Level At Time Of Evaluation supervision  -MD     Pain Assessment    Pain Assessment No/denies pain  -MD     Cognitive Assessment/Intervention    Current Cognitive/Communication Assessment functional  -MD     Orientation Status oriented x 4  -MD     Follows Commands/Answers Questions 100% of the time  -MD     Personal Safety WNL/WFL  -MD     Personal Safety Interventions fall prevention program maintained;muscle strengthening facilitated;nonskid shoes/slippers when out of bed;supervised activity  -MD     ROM (Range of Motion)    General ROM no range of motion deficits identified  -MD     MMT (Manual Muscle Testing)    General MMT Assessment lower extremity strength deficits identified  -MD     Lower Extremity    Lower Ext Manual Muscle Testing left LE strength is WFL;right LE strength is WFL  -MD     Bed Mobility, Assessment/Treatment    Bed Mob, Supine to Sit, [place] supervision required  -MD     Bed Mob, Sit to Supine, [place] not tested  -MD     Transfer Assessment/Treatment    Transfers, Sit-Stand [place] supervision required  -MD     Transfers, Stand-Sit [place] supervision required  -MD     Gait Assessment/Treatment    Gait, [place] Level supervision required  -MD     Gait, Distance (Feet) 120  -MD     Gait, Gait Deviations  bilateral:;petty decreased  -MD     Gait, Safety Issues step length decreased  -MD     Gait, Impairments impaired balance;strength decreased  -MD     Positioning and Restraints    Pre-Treatment Position in bed  -MD     Post Treatment Position chair  -MD     In Chair sitting;call light within reach  -MD       03/16/17 1639 03/16/17 1633    General Information    Equipment Currently Used at Home  none  -RR    Living Environment    Lives With alone  -RR     Living Arrangements house  -RR     Home Accessibility no concerns  -RR     Stair Railings at Home none  -RR     Type of Financial/Environmental Concern none  -RR     Transportation Available none  -RR     Living Environment Comment n/a  -RR       03/16/17 1529       Rehab Evaluation    Evaluation Not Performed patient unavailable for evaluation   Pt off floor for x-ray PT will follow up3/17  -MD       User Key  (r) = Recorded By, (t) = Taken By, (c) = Cosigned By    Initials Name Provider Type    MD Lisa Duggan, PT Physical Therapist    RR Mignon Gillis, RN Registered Nurse    SK Yamile Maravilla, WENDY Case Manager              PT Recommendation and Plan  Anticipated Discharge Disposition: home  Planned Therapy Interventions: bed mobility training, gait training, patient/family education, transfer training  PT Frequency: evaluation only  Plan of Care Review  Plan Of Care Reviewed With: patient  Outcome Summary/Follow up Plan: Pt presents at a supervision level w functional mobility and is safe to ambulate w nsg/family.  Pt experienced minor LOB initially but was able to self correct.  Pt educated to ambulate w family/nsg while at [facility].              Outcome Measures       03/17/17 1300          How much help from another person do you currently need...    Turning from your back to your side while in flat bed without using bedrails? 4  -MD      Moving from lying on back to sitting on the side of a flat bed without bedrails? 3  -MD      Moving to and from a bed to a  chair (including a wheelchair)? 3  -MD      Standing up from a chair using your arms (e.g., wheelchair, bedside chair)? 3  -MD      Climbing 3-5 steps with a railing? 3  -MD      To walk in hospital room? 3  -MD      AM-PAC 6 Clicks Score 19  -MD      Functional Assessment    Outcome Measure Options AM-PAC 6 Clicks Basic Mobility (PT)  -MD        User Key  (r) = Recorded By, (t) = Taken By, (c) = Cosigned By    Initials Name Provider Type    MD Lisa Duggan PT Physical Therapist           Time Calculation:         PT Charges       03/17/17 1312          Time Calculation    Start Time 1250  -MD      Stop Time 1304  -MD      Time Calculation (min) 14 min  -MD      PT Received On 03/17/17  -MD        User Key  (r) = Recorded By, (t) = Taken By, (c) = Cosigned By    Initials Name Provider Type    MD Lisa Duggan, PT Physical Therapist          Therapy Charges for Today     Code Description Service Date Service Provider Modifiers Qty    67381029171 HC PT EVAL LOW COMPLEXITY 1 3/17/2017 Lisa Duggan, PT GP 1    32557177343 HC PT MOBILITY CURRENT 3/17/2017 Lisa Duggan, PT GP, CJ 1    76986341810 HC PT MOBILITY PROJECTED 3/17/2017 Lisa Duggan, PT GP, CJ 1    80322799475 HC PT MOBILITY DISCHARGE 3/17/2017 Lisa Duggan, PT GP, CJ 1          PT G-Codes  PT Professional Judgement Used?: Yes  Outcome Measure Options: AM-PAC 6 Clicks Basic Mobility (PT)  Score: 19  Functional Limitation: Mobility: Walking and moving around  Mobility: Walking and Moving Around Current Status (): At least 20 percent but less than 40 percent impaired, limited or restricted  Mobility: Walking and Moving Around Goal Status (): At least 20 percent but less than 40 percent impaired, limited or restricted  Mobility: Walking and Moving Around Discharge Status (): At least 20 percent but less than 40 percent impaired, limited or restricted    PT Discharge Summary  Anticipated Discharge Disposition: home  Reason for Discharge: Independent  Discharge  Destination: Home    Lisa Duggan, PT  3/17/2017

## 2017-03-17 NOTE — PROGRESS NOTES
Discharge Planning Assessment  [facility]     Patient Name: Crys Dumont  MRN: [id number]  Today's Date: 3/17/2017    Admit Date: 3/16/2017          Discharge Needs Assessment       03/17/17 0820    Living Environment    Lives With alone    Living Arrangements house    Able to Return to Prior Living Arrangements yes    Discharge Needs Assessment    Transportation Available car;family or friend will provide    Discharge Planning Comments S/W pt at bedside.  Facesheet info confirmed.  Pt states her PCP is now Dr. Leanne White, not Dr. Ascencio.   Change made.  Pt lives alone in a house, is IADLs, and can drive.  No hx of DME, HH, or SNF.              Discharge Plan       03/17/17 0821    Case Management/Social Work Plan    Plan Pt plans to return home upon DC.  Denies any DC needs.                     Demographic Summary       03/17/17 0819    Referral Information    Admission Type observation    Arrived From home or self-care    Referral Source admission list    Reason For Consult discharge planning    Record Reviewed medical record    Primary Care Physician Information    Name Dr. Leanne White            Functional Status       03/17/17 0819    Functional Status Current    Ambulation 0-->independent    Transferring 0-->independent    Toileting 0-->independent    Bathing 0-->independent    Dressing 0-->independent    Eating 0-->independent    Communication 0-->understands/communicates without difficulty    Swallowing (if score 2 or more for any item, consult Rehab Services) 0-->swallows foods/liquids without difficulty    Functional Status Prior    Ambulation 0-->independent    Transferring 0-->independent    Toileting 0-->independent    Bathing 0-->independent    Dressing 0-->independent    Eating 0-->independent    Communication 0-->understands/communicates without difficulty    Swallowing 0-->swallows foods/liquids without difficulty    IADL    Medications independent    Meal Preparation independent     Housekeeping independent    Laundry independent    Shopping independent    Oral Care independent    Activity Tolerance    Usual Activity Tolerance good    Cognitive/Perceptual/Developmental    Current Mental Status/Cognitive Functioning no deficits noted                  Patient Forms       03/17/17 0821    Patient Forms    Patient Observation Letter --   Given 3/16          Yamile Maravilla RN

## 2017-03-17 NOTE — PLAN OF CARE
Problem: Patient Care Overview (Adult)  Goal: Plan of Care Review    03/17/17 3087   Coping/Psychosocial Response Interventions   Plan Of Care Reviewed With patient   Outcome Evaluation   Outcome Summary/Follow up Plan Pt presents at a supervision level w functional mobility and is safe to ambulate w nsg/family. Pt experienced minor LOB initially but was able to self correct. Pt educated to ambulate w family/nsg while at [facility].

## 2017-03-17 NOTE — PROGRESS NOTES
"    DAILY PROGRESS NOTE  [facility]    Patient Identification:  Name: Crys Dumont  Age: 72 y.o.  Sex: female  :  1945  MRN: [id number]         Primary Care Physician: Leanne White MD    Subjective:  Interval History: feeling ok - no cp/sob/n/v/abd pains - BM this am and I visualized and it has haider blood    Objective:son at bs    Scheduled Meds:  atorvastatin 40 mg Oral Daily   budesonide-formoterol 2 puff Inhalation BID - RT   FLUoxetine 40 mg Oral Daily   lisinopril 10 mg Oral Daily     Continuous Infusions:  sodium chloride 100 mL/hr Last Rate: 100 mL/hr (17 0844)       Vital signs in last 24 hours:  Temp:  [98.1 °F (36.7 °C)-99.3 °F (37.4 °C)] 98.6 °F (37 °C)  Heart Rate:  [] 84  Resp:  [16-18] 16  BP: (123-152)/(76-92) 143/78    Intake/Output:    Intake/Output Summary (Last 24 hours) at 17 1042  Last data filed at 17 0900   Gross per 24 hour   Intake   1215 ml   Output   1200 ml   Net     15 ml       Exam:  Visit Vitals   • /78 (BP Location: Right arm, Patient Position: Lying)   • Pulse 84   • Temp 98.6 °F (37 °C) (Oral)   • Resp 16   • Ht 62\" (157.5 cm)   • Wt 137 lb 4 oz (62.3 kg)   • SpO2 93%   • BMI 25.1 kg/m2       General Appearance:    Alert, cooperative, no distress, AAOx3, not toxic                          Head:    Normocephalic, without obvious abnormality, atraumatic                         Throat:   Lips, tongue, gums normal; oral mucosa pink and moist                         Lungs:    Clear to auscultation bilaterally, respirations unlabored                 Chest Wall:    Improved ttp over rib right fracture                          Heart:    Regular rate and rhythm, S1 and S2 normal, no murmur                  Abdomen:     Soft, non-tender, bowel sounds active                 Extremities:   Extremities normal, atraumatic, no cyanosis or edema                             Data Review:  Labs in chart were " reviewed.    Assessment:  Active Hospital Problems (** Indicates Principal Problem)    Diagnosis Date Noted   • **Colitis [K52.9] 03/16/2017   • Syncope [R55] 03/16/2017   • Orthostatic hypotension [I95.1] 03/16/2017   • Anemia due to acute blood loss [D62] 03/16/2017      Resolved Hospital Problems    Diagnosis Date Noted Date Resolved   No resolved problems to display.       Plan:  Colitis with recent Cscope w/ reported polypectomy x1 w/ poor bowel prep though patient and family are unsure if there was any diverticulosis though likely etiology  -advance to FLD  -appreciate GI consult - agree no ischemia and duplex was dc'd     Near Syncope secondary to orthostasis - BP meds were recently doubled - SLIVF/monitor   -PT eval  -check echo      ABLA/heme + and still w/ visualized brb per BM in toilet   -Hgb 12.6-9.7 - also cetain element of dilutional effect secondary to IVF     SCDs for DVT prophylaxis      Right sided chest wall pain - bruised over area most tender - 9th rib fracture w/out PTX    Dispo - DC 3/18 if hematochezia resolves and hgb stabilizes - OBS additional day    Grant Arana MD  3/17/2017  10:42 AM

## 2017-03-17 NOTE — PLAN OF CARE
Problem: Patient Care Overview (Adult)  Goal: Plan of Care Review  Outcome: Outcome(s) achieved Date Met:  03/17/17 03/17/17 0341   Coping/Psychosocial Response Interventions   Plan Of Care Reviewed With patient   Patient Care Overview   Progress improving   Outcome Evaluation   Outcome Summary/Follow up Plan Pt resting comfortably at this time, no c/o pain or discomfort at present. VS stable, will continue to monitor.        Goal: Adult Individualization and Mutuality  Outcome: Ongoing (interventions implemented as appropriate)    Problem: Pain, Acute (Adult)  Goal: Acceptable Pain Control/Comfort Level  Outcome: Outcome(s) achieved Date Met:  03/17/17

## 2017-03-17 NOTE — PLAN OF CARE
Problem: Patient Care Overview (Adult)  Goal: Plan of Care Review  Outcome: Ongoing (interventions implemented as appropriate)    03/17/17 1544   Coping/Psychosocial Response Interventions   Plan Of Care Reviewed With patient   Patient Care Overview   Progress improving   Outcome Evaluation   Outcome Summary/Follow up Plan IVF's discontinued,up to chair, VSS       Goal: Adult Individualization and Mutuality  Outcome: Ongoing (interventions implemented as appropriate)    Problem: Pain, Acute (Adult)  Goal: Acceptable Pain Control/Comfort Level  Outcome: Ongoing (interventions implemented as appropriate)

## 2017-03-17 NOTE — CONSULTS
"Inpatient Consult to Gastroenterology  Consult performed by: DIANNE MCGINNIS  Consult ordered by: JOSÉ MANUEL MCCAULEY          Patient Care Team:  Edilson Ascencio MD as PCP - General    Chief complaint: bleeding    Subjective     History of Present Illness  Ms Dumont is a pleasant 72-year-old female who states that she had a colonoscopy performed by Dr. Hardin at [facility] on Friday prior to this admission. I do not have the actual documentation from the colonoscopy at this time but the patient and son at bedside states that there was one polyp less than a centimeter which was removed and there was a poor bowel prep but there was no mention of any issues with diverticulosis. This was done at [facility].  The patient states she did fine over the weekend on Saturday and Sunday. She states Sunday night she rolled out of bed which was a mechanical fall and there was no issue of syncope at that time. She states she went to work as the beginning of the week and she had a near syncopal episode where she just felt everything kind of fading and she sat down and never really truly lost consciousness. She denied any chest pain at the time but did admit that her blood pressure medication was recently doubled. She's not had any issues with fever chills or night sweats or nor she had any major complaints of abdominal pain. She came to the hospital because she had some bright red blood per rectum was found to be heme positive. The CT alludes to possible \"descending colitis\"  Patient was also found to have lower blood count at 10.9.  Patient admits a feeling fatigued but denies any issues with nausea vomiting chest pain or shortness of breath.  She has had no further bleeding and feels fine at this point in time.   Review of Systems   Constitutional: Positive for fatigue.   HENT: Negative.    Eyes: Negative.    Respiratory: Positive for cough.    Cardiovascular: Negative.    Gastrointestinal: Positive for anal " bleeding and blood in stool.   Endocrine: Negative.    Genitourinary: Negative.    Musculoskeletal: Negative.    Skin: Negative.    Allergic/Immunologic: Negative.    Neurological: Negative.    Psychiatric/Behavioral: Negative for confusion.        Past Medical History   Diagnosis Date   • Breast cancer    • COPD (chronic obstructive pulmonary disease)    • Hyperlipidemia    • Hypertension    ,   Past Surgical History   Procedure Laterality Date   • Cataract extraction Right 12/08/2015   • Cataract extraction Left 12/29/2015   • Breast lumpectomy with axillary node dissection Left 1998   • Mastectomy Left 2008   • Orif ankle fracture Left    , History reviewed. No pertinent family history.,   Social History   Substance Use Topics   • Smoking status: Former Smoker     Packs/day: 0.50     Years: 10.00     Types: Cigarettes   • Smokeless tobacco: Never Used   • Alcohol use 1.2 oz/week     1 Glasses of wine, 1 Cans of beer per week   ,   Prescriptions Prior to Admission   Medication Sig Dispense Refill Last Dose   • atorvastatin (LIPITOR) 40 MG tablet Take 40 mg by mouth Daily.   Taking   • calcium carbonate-vitamin d 600-400 MG-UNIT per tablet Take 1 tablet by mouth Daily.   Taking   • diphenhydrAMINE (BENADRYL) 50 MG capsule Take 50 mg by mouth At Night As Needed for Itching.      • FLUoxetine (PROzac) 20 MG capsule Take 1 capsule by mouth daily. (Patient taking differently: Take 40 mg by mouth Daily.) 90 capsule 1    • lisinopril (PRINIVIL,ZESTRIL) 10 MG tablet Take 20 mg by mouth Daily.   Taking   • Multiple Vitamin (MULTIVITAMIN) capsule Take 1 capsule by mouth Daily.   Taking   • PROAIR  (90 BASE) MCG/ACT inhaler INHALE 2 PUFFS BY MOUTH EVERY 4 TO 6 HOURS AS NEEDED 8.5 inhaler 1 Taking   • SYMBICORT 80-4.5 MCG/ACT inhaler INHALE 2 PUFFS TWICE DAILY. RINSE MOUTH AFTER USE. 30.6 inhaler 1    • atorvastatin (LIPITOR) 40 MG tablet TAKE 1 TABLET BY MOUTH EVERY DAY  1 Taking   • atorvastatin (LIPITOR) 40 MG  tablet TAKE 1 TABLET BY MOUTH EVERY DAY 90 tablet 1    • FLUoxetine (PROzac) 20 MG capsule TAKE ONE CAPSULE BY MOUTH EVERY DAY 30 capsule 5 Taking   • Ibuprofen 200 MG capsule Take 2 tablets by mouth Every 8 (Eight) Hours As Needed (pain).   Taking   • lisinopril (PRINIVIL,ZESTRIL) 10 MG tablet TAKE 1 TABLET BY MOUTH EVERY DAY  1 Taking   • lisinopril (PRINIVIL,ZESTRIL) 10 MG tablet TAKE 1 TABLET BY MOUTH EVERY DAY 90 tablet 1    • Omega 3 1000 MG capsule Take  by mouth.   Taking   , Scheduled Meds:    atorvastatin 40 mg Oral Daily   budesonide-formoterol 2 puff Inhalation BID - RT   FLUoxetine 40 mg Oral Daily   lisinopril 10 mg Oral Daily   , Continuous Infusions:    sodium chloride 100 mL/hr Last Rate: 100 mL/hr (03/17/17 0258)   , PRN Meds:  •  acetaminophen  •  albuterol  •  diphenhydrAMINE  •  HYDROcodone-acetaminophen  •  nitroglycerin  •  ondansetron  •  sodium chloride and Allergies:  Sulfa antibiotics    Objective      Vital Signs  Temp:  [98.1 °F (36.7 °C)-99.3 °F (37.4 °C)] 98.5 °F (36.9 °C)  Heart Rate:  [] 95  Resp:  [16-18] 16  BP: (123-152)/(76-92) 148/78    Physical Exam   Constitutional: She is oriented to person, place, and time. She appears ill.   HENT:   Poor dentition   Eyes: Conjunctivae are normal.   Neck: Neck supple.   Cardiovascular: Normal rate.    Pulmonary/Chest: Effort normal. She has rhonchi in the right middle field, the right lower field, the left middle field and the left lower field.   Abdominal: Soft. She exhibits no mass. There is no rebound and no guarding. No hernia.   Neurological: She is alert and oriented to person, place, and time.       Results Review:    I reviewed the patient's new clinical results.  I reviewed the patient's new imaging results and agree with the interpretation.        Assessment/Plan     Principal Problem:    Colitis  Active Problems:    Syncope    Orthostatic hypotension    Anemia due to acute blood loss      Assessment:  (Lower gastrointestinal  bleeding: suspect a post polypectomy bleed, cant exclude diverticular bleed.  I would expect a history of abdominal pain if this was truly ischemic colitis).     Plan:   (Discussed with patient colonoscopy to look for site of bleed and if there is evidence of a polypectomy bleed to provide endoscopic treatment  .She declines this exam. Given the potential causes, the all are more than likely to stop bleeding on their own and she is stable  I would advance diet, observe and if she remains stable discharge later today or tomorrow. thanks).       I discussed the patients findings and my recommendations with patient and nursing staff    Dane Phan MD  03/17/17  7:28 AM    Time: Critical care 15 min

## 2017-03-17 NOTE — THERAPY DISCHARGE NOTE
Acute Care - Physical Therapy Initial Eval/Discharge  [facility]     Patient Name: Crys Dumont  : 1945  MRN: [id number]  Today's Date: 3/17/2017   Onset of Illness/Injury or Date of Surgery Date: 17  Date of Referral to PT: 17  Referring Physician: Yasmeen      Admit Date: 3/16/2017    Visit Dx:    ICD-10-CM ICD-9-CM   1. Colitis K52.9 558.9   2. Gastrointestinal hemorrhage, unspecified gastrointestinal hemorrhage type K92.2 578.9   3. Near syncope R55 780.2     Patient Active Problem List   Diagnosis   • Asthma   • Paraparesis   • Shoulder pain   • Malignant neoplasm of breast   • Cataract of both eyes   • Abnormal kidney function   • Fatigue   • Unsteady gait   • Hypertension   • Hyperlipidemia   • Abnormal glucose tolerance test (GTT)   • Arthralgia of hip   • Impairment of balance   • Thoracic back pain   • Positive Romberg test   • Colitis   • Syncope   • Orthostatic hypotension   • Anemia due to acute blood loss     Past Medical History   Diagnosis Date   • Breast cancer    • COPD (chronic obstructive pulmonary disease)    • Hyperlipidemia    • Hypertension      Past Surgical History   Procedure Laterality Date   • Cataract extraction Right 2015   • Cataract extraction Left 2015   • Breast lumpectomy with axillary node dissection Left    • Mastectomy Left    • Orif ankle fracture Left           PT ASSESSMENT (last 72 hours)      PT Evaluation       17 1313 17 0820    Rehab Evaluation    Document Type evaluation  -MD     Subjective Information agree to therapy;no complaints  -MD     Patient Effort, Rehab Treatment good  -MD     Symptoms Noted During/After Treatment none  -MD     General Information    Patient Profile Review yes  -MD     Onset of Illness/Injury or Date of Surgery Date 17  -MD     Referring Physician Yasmeen  -MD     General Observations Pt supine in bed showing no signs of acute distress.  -MD     Precautions/Limitations no known  precautions/limitations  -MD     Prior Level of Function independent:  -MD     Equipment Currently Used at Home none  -MD     Plans/Goals Discussed With patient  -MD     Living Environment    Lives With alone  -MD alone  -SK    Living Arrangements house  -MD house  -SK    Home Accessibility stairs to enter home  -MD     Number of Stairs to Enter Home 3  -MD     Stair Railings at Home other (see comments)   HR present  -MD     Transportation Available  car;family or friend will provide  -SK    Clinical Impression    Date of Referral to PT 03/17/17  -MD     PT Diagnosis Pt supervision w mobility and in no need of skilled PT at this time.  -MD     Functional Level At Time Of Evaluation supervision  -MD     Pain Assessment    Pain Assessment No/denies pain  -MD     Cognitive Assessment/Intervention    Current Cognitive/Communication Assessment functional  -MD     Orientation Status oriented x 4  -MD     Follows Commands/Answers Questions 100% of the time  -MD     Personal Safety WNL/WFL  -MD     Personal Safety Interventions fall prevention program maintained;muscle strengthening facilitated;nonskid shoes/slippers when out of bed;supervised activity  -MD     ROM (Range of Motion)    General ROM no range of motion deficits identified  -MD     MMT (Manual Muscle Testing)    General MMT Assessment lower extremity strength deficits identified  -MD     Lower Extremity    Lower Ext Manual Muscle Testing left LE strength is WFL;right LE strength is WFL  -MD     Bed Mobility, Assessment/Treatment    Bed Mob, Supine to Sit, [place] supervision required  -MD     Bed Mob, Sit to Supine, [place] not tested  -MD     Transfer Assessment/Treatment    Transfers, Sit-Stand [place] supervision required  -MD     Transfers, Stand-Sit [place] supervision required  -MD     Gait Assessment/Treatment    Gait, [place] Level supervision required  -MD     Gait, Distance (Feet) 120  -MD     Gait, Gait Deviations  bilateral:;petty decreased  -MD     Gait, Safety Issues step length decreased  -MD     Gait, Impairments impaired balance;strength decreased  -MD     Positioning and Restraints    Pre-Treatment Position in bed  -MD     Post Treatment Position chair  -MD     In Chair sitting;call light within reach  -MD       03/16/17 1639 03/16/17 1633    General Information    Equipment Currently Used at Home  none  -RR    Living Environment    Lives With alone  -RR     Living Arrangements house  -RR     Home Accessibility no concerns  -RR     Stair Railings at Home none  -RR     Type of Financial/Environmental Concern none  -RR     Transportation Available none  -RR     Living Environment Comment n/a  -RR       03/16/17 1529       Rehab Evaluation    Evaluation Not Performed patient unavailable for evaluation   Pt off floor for x-ray PT will follow up3/17  -MD       User Key  (r) = Recorded By, (t) = Taken By, (c) = Cosigned By    Initials Name Provider Type    MD Lisa Duggan, PT Physical Therapist    RR Mignon Gillis, RN Registered Nurse    SK Yamile Maravilla, WENDY Case Manager              PT Recommendation and Plan  Anticipated Discharge Disposition: home  Planned Therapy Interventions: bed mobility training, gait training, patient/family education, transfer training  PT Frequency: evaluation only  Plan of Care Review  Plan Of Care Reviewed With: patient  Outcome Summary/Follow up Plan: Pt presents at a supervision level w functional mobility and is safe to ambulate w nsg/family.  Pt experienced minor LOB initially but was able to self correct.  Pt educated to ambulate w family/nsg while at [facility].              Outcome Measures       03/17/17 1300          How much help from another person do you currently need...    Turning from your back to your side while in flat bed without using bedrails? 3  -MD      Moving from lying on back to sitting on the side of a flat bed without bedrails? 3  -MD      Moving to and from a bed to a  chair (including a wheelchair)? 3  -MD      Standing up from a chair using your arms (e.g., wheelchair, bedside chair)? 3  -MD      Climbing 3-5 steps with a railing? 2  -MD      To walk in hospital room? 3  -MD      AM-PAC 6 Clicks Score 17  -MD      Functional Assessment    Outcome Measure Options AM-PAC 6 Clicks Basic Mobility (PT)  -MD        User Key  (r) = Recorded By, (t) = Taken By, (c) = Cosigned By    Initials Name Provider Type    MD Lisa Duggan, PT Physical Therapist           Time Calculation:         PT Charges       03/17/17 1312          Time Calculation    Start Time 1250  -MD      Stop Time 1304  -MD      Time Calculation (min) 14 min  -MD      PT Received On 03/17/17  -MD        User Key  (r) = Recorded By, (t) = Taken By, (c) = Cosigned By    Initials Name Provider Type    MD Lisa Duggan, PT Physical Therapist          Therapy Charges for Today     Code Description Service Date Service Provider Modifiers Qty    70559646026 HC PT EVAL LOW COMPLEXITY 1 3/17/2017 Lisa Duggan, PT GP 1          PT G-Codes  Outcome Measure Options: AM-PAC 6 Clicks Basic Mobility (PT)    PT Discharge Summary  Anticipated Discharge Disposition: home    Lsia Duggan PT  3/17/2017

## 2017-03-18 VITALS
WEIGHT: 136.7 LBS | TEMPERATURE: 98.3 F | OXYGEN SATURATION: 94 % | DIASTOLIC BLOOD PRESSURE: 75 MMHG | SYSTOLIC BLOOD PRESSURE: 150 MMHG | HEIGHT: 62 IN | BODY MASS INDEX: 25.16 KG/M2 | HEART RATE: 75 BPM | RESPIRATION RATE: 16 BRPM

## 2017-03-18 LAB
DEPRECATED RDW RBC AUTO: 43.4 FL (ref 37–54)
ERYTHROCYTE [DISTWIDTH] IN BLOOD BY AUTOMATED COUNT: 12.9 % (ref 11.7–13)
HCT VFR BLD AUTO: 27.6 % (ref 35.6–45.5)
HGB BLD-MCNC: 9.2 G/DL (ref 11.9–15.5)
MCH RBC QN AUTO: 30.6 PG (ref 26.9–32)
MCHC RBC AUTO-ENTMCNC: 33.3 G/DL (ref 32.4–36.3)
MCV RBC AUTO: 91.7 FL (ref 80.5–98.2)
PLATELET # BLD AUTO: 228 10*3/MM3 (ref 140–500)
PMV BLD AUTO: 9.3 FL (ref 6–12)
RBC # BLD AUTO: 3.01 10*6/MM3 (ref 3.9–5.2)
WBC NRBC COR # BLD: 6.33 10*3/MM3 (ref 4.5–10.7)

## 2017-03-18 PROCEDURE — G0378 HOSPITAL OBSERVATION PER HR: HCPCS

## 2017-03-18 PROCEDURE — 85027 COMPLETE CBC AUTOMATED: CPT | Performed by: HOSPITALIST

## 2017-03-18 PROCEDURE — 94799 UNLISTED PULMONARY SVC/PX: CPT

## 2017-03-18 RX ORDER — LISINOPRIL 10 MG/1
10 TABLET ORAL DAILY
Start: 2017-03-18

## 2017-03-18 RX ORDER — ACETAMINOPHEN 325 MG/1
650 TABLET ORAL EVERY 4 HOURS PRN
Refills: 0
Start: 2017-03-18

## 2017-03-18 RX ADMIN — BUDESONIDE AND FORMOTEROL FUMARATE DIHYDRATE 2 PUFF: 80; 4.5 AEROSOL RESPIRATORY (INHALATION) at 08:22

## 2017-03-18 RX ADMIN — FLUOXETINE 40 MG: 20 CAPSULE ORAL at 09:31

## 2017-03-18 RX ADMIN — ATORVASTATIN CALCIUM 40 MG: 40 TABLET, FILM COATED ORAL at 09:31

## 2017-03-18 RX ADMIN — LISINOPRIL 10 MG: 10 TABLET ORAL at 09:31

## 2017-03-18 NOTE — DISCHARGE SUMMARY
DATE OF ADMISSION:  03/16/2017  DATE OF DISCHARGE:  03/18/2017    CONSULTANT:  Dane Phan MD, in place of Dr. Hardin for GI.     DISCHARGE DIAGNOSES:  1.  Colitis per CT.   2.  Gastrointestinal bleed, most likely diverticular in etiology.   3.  Near-syncope, secondary to orthostasis; see hospital course.   4.  Acute blood loss anemia with heme-positive stools, with a hemoglobin of 9.2 at discharge.   5.  Right-sided chest wall pain secondary to a 9th rib fracture, without pneumothorax.     HOSPITAL COURSE:  The patient is a wonderfully pleasant 72-year-old female, initially admitted by myself due to complaints of near-syncope and bright-red blood per rectum. See my history and physical for further clarification of admission diagnosis. Basically, the patient had a recent colonoscopy performed as an outpatient by Dr. Hardin. He does not come to this location, so I consulted Dr. Dane Phan in his place. At admission I had concerns about possible ischemia, though CT of the abdomen and pelvis clearly demonstrated good blood flow via all 3 arteries providing the gut. I initially was going to do a duplex ultrasound of the abdomen, but that was discontinued. I placed the patient on clears and consulted Dr. Phan for any additional recommendations. He offered repeat colonoscopy, though the patient declined. He also agreed there could be an aspect of diverticular bleed. The patient did have a polypectomy x1. Since being here, she had 1. She was found to be heme-positive in the ER, and she had 1 bloody stool which was well greater than 24 hours prior to discharge and I had visualized this stool. Her hemoglobin has shown a downward trend; though she is starting to equilibrate, as her last 3 readings have gone from 9.3 to 9.7 to 9.2. The patient is tolerating a full liquid diet, and I have encouraged her to stay on his diet for the next couple of days and slowly advance as tolerated. I think the patient should respond  well with bowel rest. She is not having any issues with nausea/vomiting, nor does she have any issues with abdominal pain or aspects of fever to give us concerning aspects that the colitis could be of an infectious etiology. Due to her previous near syncopal episodes, she did hit her right side of her chest wall and did demonstrate a 9th rib fracture noted on a rib x-ray, but there were no complicating features such as pneumothorax. At the time of this dictation, her vital signs are stable as well as afebrile. I have backed off on her blood pressure medications, back to her previous dose of 10 mg of lisinopril and will defer further management to her PCP. I feel the patient is medically stable for disposition at this juncture, with good outpatient followup.     DISPOSITION:  To home, without any additional discharge needs.    FOLLOWUP:  Patient is going to contact her PCP as well as GI on Monday following discharge, and I have encouraged her to see her PCP closely within the next week or so,  to have repeat CBC performed at that time to ensure blood counts are further improving.     DISCONTINUED MEDICATIONS:   Ibuprofen.    DISCHARGE MEDICATIONS:   1.  Tylenol p.r.n.   2.  ProAir p.r.n.  3.  Symbicort 80/4.5 twice daily.   4.  Prozac 40 mg daily.   5.  Benadryl nightly as needed for sleep.  6.  Lipitor 40 mg daily.   7.  Lisinopril decreased from 20 to 10 mg temporarily; see Hospital course   8.  Calcium with vitamin D daily.   9.  Multivitamin daily.   10.  Omega-3 fish oils daily.     No new medications prescribed at discharge.     Less than 30 minutes spent organizing discharge.       Grant Arana MD  WILSON:ms  D:   03/18/2017 11:16:14  T:   03/18/2017 18:41:29  Job ID:   [id number]  Document ID:   [id number]  cc:

## 2017-03-18 NOTE — PLAN OF CARE
Problem: Patient Care Overview (Adult)  Goal: Plan of Care Review  Outcome: Ongoing (interventions implemented as appropriate)    03/18/17 0235   Coping/Psychosocial Response Interventions   Plan Of Care Reviewed With patient   Patient Care Overview   Progress improving   Outcome Evaluation   Outcome Summary/Follow up Plan VSS. No complaints. Had to place 2L of oxygen via nasal cannula during the night due to patient desating in the mid to upper 80's. Possible d/c today. Will continue to monitor.        Goal: Adult Individualization and Mutuality  Outcome: Ongoing (interventions implemented as appropriate)  Goal: Discharge Needs Assessment  Outcome: Ongoing (interventions implemented as appropriate)    Problem: Pain, Acute (Adult)  Goal: Acceptable Pain Control/Comfort Level  Outcome: Ongoing (interventions implemented as appropriate)

## 2017-06-08 RX ORDER — DILTIAZEM HYDROCHLORIDE 60 MG/1
TABLET, FILM COATED ORAL
Qty: 30.6 INHALER | Refills: 1 | OUTPATIENT
Start: 2017-06-08

## 2018-01-27 ENCOUNTER — HOSPITAL ENCOUNTER (EMERGENCY)
Facility: HOSPITAL | Age: 73
Discharge: HOME | End: 2018-01-28
Attending: EMERGENCY MEDICINE | Admitting: EMERGENCY MEDICINE

## 2018-01-27 ENCOUNTER — APPOINTMENT (OUTPATIENT)
Dept: GENERAL RADIOLOGY | Facility: HOSPITAL | Age: 73
End: 2018-01-27

## 2018-01-27 ENCOUNTER — APPOINTMENT (OUTPATIENT)
Dept: CT IMAGING | Facility: HOSPITAL | Age: 73
End: 2018-01-27

## 2018-01-27 DIAGNOSIS — I10 HYPERTENSION, UNSPECIFIED TYPE: ICD-10-CM

## 2018-01-27 DIAGNOSIS — R07.89 CHEST WALL PAIN: Primary | ICD-10-CM

## 2018-01-27 LAB
ALBUMIN SERPL-MCNC: 3.9 G/DL (ref 3.5–5.2)
ALBUMIN/GLOB SERPL: 1.4 G/DL
ALP SERPL-CCNC: 72 U/L (ref 39–117)
ALT SERPL W P-5'-P-CCNC: 17 U/L (ref 1–33)
ANION GAP SERPL CALCULATED.3IONS-SCNC: 14.7 MMOL/L
AST SERPL-CCNC: 21 U/L (ref 1–32)
BASOPHILS # BLD AUTO: 0.05 10*3/MM3 (ref 0–0.2)
BASOPHILS NFR BLD AUTO: 0.8 % (ref 0–1.5)
BILIRUB SERPL-MCNC: 0.3 MG/DL (ref 0.1–1.2)
BUN BLD-MCNC: 31 MG/DL (ref 8–23)
BUN/CREAT SERPL: 32 (ref 7–25)
CALCIUM SPEC-SCNC: 8.7 MG/DL (ref 8.6–10.5)
CHLORIDE SERPL-SCNC: 98 MMOL/L (ref 98–107)
CO2 SERPL-SCNC: 24.3 MMOL/L (ref 22–29)
CREAT BLD-MCNC: 0.97 MG/DL (ref 0.57–1)
D DIMER PPP FEU-MCNC: 0.5 MCGFEU/ML (ref 0–0.49)
DEPRECATED RDW RBC AUTO: 44.7 FL (ref 37–54)
EOSINOPHIL # BLD AUTO: 0.41 10*3/MM3 (ref 0–0.7)
EOSINOPHIL NFR BLD AUTO: 6.4 % (ref 0.3–6.2)
ERYTHROCYTE [DISTWIDTH] IN BLOOD BY AUTOMATED COUNT: 12.9 % (ref 11.7–13)
GFR SERPL CREATININE-BSD FRML MDRD: 56 ML/MIN/1.73
GLOBULIN UR ELPH-MCNC: 2.8 GM/DL
GLUCOSE BLD-MCNC: 184 MG/DL (ref 65–99)
HCT VFR BLD AUTO: 35.4 % (ref 35.6–45.5)
HGB BLD-MCNC: 11.6 G/DL (ref 11.9–15.5)
IMM GRANULOCYTES # BLD: 0 10*3/MM3 (ref 0–0.03)
IMM GRANULOCYTES NFR BLD: 0 % (ref 0–0.5)
INR PPP: 1.03 (ref 0.9–1.1)
LYMPHOCYTES # BLD AUTO: 1.88 10*3/MM3 (ref 0.9–4.8)
LYMPHOCYTES NFR BLD AUTO: 29.3 % (ref 19.6–45.3)
MCH RBC QN AUTO: 31.2 PG (ref 26.9–32)
MCHC RBC AUTO-ENTMCNC: 32.8 G/DL (ref 32.4–36.3)
MCV RBC AUTO: 95.2 FL (ref 80.5–98.2)
MONOCYTES # BLD AUTO: 0.82 10*3/MM3 (ref 0.2–1.2)
MONOCYTES NFR BLD AUTO: 12.8 % (ref 5–12)
NEUTROPHILS # BLD AUTO: 3.25 10*3/MM3 (ref 1.9–8.1)
NEUTROPHILS NFR BLD AUTO: 50.7 % (ref 42.7–76)
NT-PROBNP SERPL-MCNC: 104.4 PG/ML (ref 5–900)
PLATELET # BLD AUTO: 261 10*3/MM3 (ref 140–500)
PMV BLD AUTO: 10.5 FL (ref 6–12)
POTASSIUM BLD-SCNC: 3.8 MMOL/L (ref 3.5–5.2)
PROT SERPL-MCNC: 6.7 G/DL (ref 6–8.5)
PROTHROMBIN TIME: 13.1 SECONDS (ref 11.7–14.2)
RBC # BLD AUTO: 3.72 10*6/MM3 (ref 3.9–5.2)
SODIUM BLD-SCNC: 137 MMOL/L (ref 136–145)
WBC NRBC COR # BLD: 6.41 10*3/MM3 (ref 4.5–10.7)

## 2018-01-27 PROCEDURE — 99284 EMERGENCY DEPT VISIT MOD MDM: CPT

## 2018-01-27 PROCEDURE — 83880 ASSAY OF NATRIURETIC PEPTIDE: CPT | Performed by: EMERGENCY MEDICINE

## 2018-01-27 PROCEDURE — 0 IOPAMIDOL PER 1 ML: Performed by: EMERGENCY MEDICINE

## 2018-01-27 PROCEDURE — 71046 X-RAY EXAM CHEST 2 VIEWS: CPT

## 2018-01-27 PROCEDURE — 80053 COMPREHEN METABOLIC PANEL: CPT | Performed by: EMERGENCY MEDICINE

## 2018-01-27 PROCEDURE — 85610 PROTHROMBIN TIME: CPT | Performed by: EMERGENCY MEDICINE

## 2018-01-27 PROCEDURE — 93010 ELECTROCARDIOGRAM REPORT: CPT | Performed by: INTERNAL MEDICINE

## 2018-01-27 PROCEDURE — 85379 FIBRIN DEGRADATION QUANT: CPT | Performed by: EMERGENCY MEDICINE

## 2018-01-27 PROCEDURE — 71275 CT ANGIOGRAPHY CHEST: CPT

## 2018-01-27 PROCEDURE — 93005 ELECTROCARDIOGRAM TRACING: CPT | Performed by: EMERGENCY MEDICINE

## 2018-01-27 PROCEDURE — 85025 COMPLETE CBC W/AUTO DIFF WBC: CPT | Performed by: EMERGENCY MEDICINE

## 2018-01-27 RX ORDER — SODIUM CHLORIDE 0.9 % (FLUSH) 0.9 %
10 SYRINGE (ML) INJECTION AS NEEDED
Status: DISCONTINUED | OUTPATIENT
Start: 2018-01-27 | End: 2018-01-28 | Stop reason: HOSPADM

## 2018-01-27 RX ORDER — NAPROXEN SODIUM 220 MG
220 TABLET ORAL 2 TIMES DAILY PRN
COMMUNITY

## 2018-01-27 RX ADMIN — IOPAMIDOL 95 ML: 755 INJECTION, SOLUTION INTRAVENOUS at 23:30

## 2018-01-28 VITALS
DIASTOLIC BLOOD PRESSURE: 88 MMHG | WEIGHT: 135 LBS | HEIGHT: 62 IN | TEMPERATURE: 98 F | RESPIRATION RATE: 12 BRPM | OXYGEN SATURATION: 96 % | HEART RATE: 81 BPM | BODY MASS INDEX: 24.84 KG/M2 | SYSTOLIC BLOOD PRESSURE: 160 MMHG

## 2022-05-17 ENCOUNTER — OFFICE (OUTPATIENT)
Dept: URBAN - METROPOLITAN AREA CLINIC 75 | Facility: CLINIC | Age: 77
End: 2022-05-17

## 2022-05-17 VITALS
SYSTOLIC BLOOD PRESSURE: 138 MMHG | OXYGEN SATURATION: 98 % | WEIGHT: 118 LBS | DIASTOLIC BLOOD PRESSURE: 78 MMHG | HEART RATE: 87 BPM

## 2022-05-17 DIAGNOSIS — Z86.010 PERSONAL HISTORY OF COLONIC POLYPS: ICD-10-CM

## 2022-05-17 DIAGNOSIS — R85.89 OTHER ABNORMAL FINDINGS IN SPECIMENS FROM DIGESTIVE ORGANS A: ICD-10-CM

## 2022-05-17 DIAGNOSIS — Z85.3 PERSONAL HISTORY OF MALIGNANT NEOPLASM OF BREAST: ICD-10-CM

## 2022-05-17 DIAGNOSIS — Z80.0 FAMILY HISTORY OF MALIGNANT NEOPLASM OF DIGESTIVE ORGANS: ICD-10-CM

## 2022-05-17 PROCEDURE — 99204 OFFICE O/P NEW MOD 45 MIN: CPT | Performed by: INTERNAL MEDICINE
